# Patient Record
Sex: MALE | Race: WHITE | Employment: FULL TIME | URBAN - METROPOLITAN AREA
[De-identification: names, ages, dates, MRNs, and addresses within clinical notes are randomized per-mention and may not be internally consistent; named-entity substitution may affect disease eponyms.]

---

## 2019-06-11 ENCOUNTER — HOSPITAL ENCOUNTER (OUTPATIENT)
Dept: GENERAL RADIOLOGY | Age: 44
Discharge: HOME | End: 2019-06-11
Payer: COMMERCIAL

## 2019-06-11 DIAGNOSIS — S67.21XA CRUSH INJURY OF HAND, RIGHT, INITIAL ENCOUNTER: ICD-10-CM

## 2019-06-11 PROCEDURE — 73130 X-RAY EXAM OF HAND: CPT

## 2021-06-22 ENCOUNTER — OFFICE VISIT (OUTPATIENT)
Dept: SURGERY | Age: 46
End: 2021-06-22
Payer: COMMERCIAL

## 2021-06-22 VITALS
BODY MASS INDEX: 22.46 KG/M2 | HEART RATE: 80 BPM | SYSTOLIC BLOOD PRESSURE: 113 MMHG | WEIGHT: 175 LBS | HEIGHT: 74 IN | DIASTOLIC BLOOD PRESSURE: 72 MMHG

## 2021-06-22 DIAGNOSIS — K64.2 GRADE III HEMORRHOIDS: Primary | ICD-10-CM

## 2021-06-22 PROCEDURE — 99203 OFFICE O/P NEW LOW 30 MIN: CPT | Performed by: SURGERY

## 2021-06-22 RX ORDER — PRIMIDONE 50 MG/1
TABLET ORAL
COMMUNITY
Start: 2021-05-05

## 2021-06-22 NOTE — PATIENT INSTRUCTIONS
Hemorrhoids: Information and Care Instructions        Hemorrhoids are enlarged veins that develop in the anal canal. They can occur with constipation, diarrhea, straining and pushing during bowel movements, pregnancy, and smoking/coughing. The tissue can get irritated and inflamed, causing bleeding, itching, swelling, and pain. Hemorrhoids can be internal or external (or occasionally both). Sometimes internal hemorrhoids can prolapse, or come out of the anus, causing difficulty keeping clean, mucus drainage, bleeding, and discomfort. External hemorrhoids are more likely to clot and cause sudden severe pain on the outside of the anus. They can be uncomfortable at times, but hemorrhoids rarely are a life-threatening problem. However, not all bleeding and pain can be blamed on hemorrhoids until a thorough examination (and sometimes a colonoscopy) is performed. The initial treatment for both internal and external hemorrhoids is the same, as below:    STOOL REGIMEN for hemorrhoids:    Stools should be soft, formed, and easy to pass consistency, not diarrhea, without the need to strain. 1) Eat a healthy diet with lots of fruits and vegetables. Exercise and be active. 2) Use a fiber supplement twice daily (Metamucil, Benefiber, Konsyl, Citrucel, generic brand, etc) per package instructions. - Women should aim for 25 grams of fiber daily.    - Men should aim for 30-35 grams of fiber daily. 3) Drink 6-8 glasses of water per day. This will work with the fiber to regulate your stools. 4) MiraLax is safe to use every day, and can be used to help lubricate and soften stool. 5) Colace stool softeners can also be used as needed. Avoid Senna and sennakot laxative products, as they may damage the colon with long-term use. Warm water sitz baths (sit in a tub filled with 3-4 inches of warm water) can be done 1-2 times daily for 5 min to help with hygiene and relaxation.      DO NOT STRAIN ON THE

## 2021-06-22 NOTE — PROGRESS NOTES
deformity. Lips normal, normal dentition. Neck: No masses. Trachea midline. No crepitus. Thyroid not enlarged. Cardiovascular: Normal rate. No peripheral edema. Abdominal aorta normal size to palpation. Pulmonary/Chest: Effort normal. No respiratory distress. No wheezes. No use of accessory muscles. Musculoskeletal: Normal range of motion x all 4 extremities and head/neck, without deformity, pain, or crepitus, with normal strength and tone. Normal gait. Nails without clubbing or cyanosis. Neurological: Alert and oriented to person, place, and time. No gross deficits. Sensation intact. Skin: Skin is dry. No rashes noted. No pallor. No induration of nodules. Psychiatric: Normal mood and affect. Behavior normal. Oriented to person, place, and time. Judgment and insight reasonable. Abdominal/wound: soft, nontneder    During my initial anorectal exam I was unable to obtain clear visualization of the anal canal, so I determined an anoscopy would be required. I discussed with the patient and they agreed to proceed with anoscopy. ANOSCOPY:    Chaperone/MA present in room during entire exam. Patient was placed in left lateral down or knee-chest positioning depending on patient comfort. Exam table manipulated for proper visualization and lighting. Buttocks spread. Inspection reveals: no perianal skin lesions, excoriation. External hemorrhoids/tags: yes . Digital exam performed with lubricated index finger revealing: no masses, induration, or tenderness. No gross blood. Normal tone. Lubricated anoscope inserted gently into anus and withdrawn with light attachment for visualization of distal rectum and anal canal: Mucosa appeared normal, without masses or evidence of proctitis. Moderately inflamed hemorrhoidal tissue visible. Bleeding: no.    Anoscope removed without difficulty. Patient tolerated procedure well without complications. See below for further recommendations.     Labs reviewed: none  Radiology reviewed: none    Last colonoscopy: 1 yr ago - normal per his recollection      Assessment/Plan:     A/P:  New problem(s): Hemorrhoids: internal with bleeding, prolapse  Established problem(s): ext skin tag  Additional workup/treatment planned and options discussed:   1) Lifestyle and stool regimen: Fiber supplementation, sitz baths, improving dietary and lifestyle choices  2) Procedural: Rubber band ligation  3) Hemorrhoidectomy - if fails less invasive meaures  Risk of complications/morbidity: moderate    Patient has signs, symptoms, and exam consistent with moderately inflamed, prolapsing, bleeding hemorrhoids. We discussed the pathophysiology, etiology, natural history, workup, and treatment options, including procedural and surgical risks for hemorrhoids. We will start with conservative management, including OTC or prescription fiber supplementation, water, healthy fruits and vegetables, and medical management. We discussed avoiding straining, constipation, and diarrhea. Sitz baths discussed. If symptoms do not improve in a reasonable amount of time or patient is already having regular soft stool with conservative management, patient may require more invasive treatment options, such as in-office rubber band ligation. We discussed the rubber band ligation procedure, including prep, expectations, potential complications, postoperative discomfort. We discussed that this would be without sedation or anesthesia, and would likely result in temporary mild pressure discomfort. We discussed functional expectations, and risks of recurrence of hemorrhoids in the future. We discussed potential need for multiple banding sessions. Hemorrhoidectomy was discussed as an option, but more likely to be employed if the patient fails conservative and procedural methods as mentioned above.   Discussed that hemorrhoidectomy can be more definitive, but has significant postoperative pain component, which makes it much less desirable as an initial treatment option. At this point he is already trialed and failed medical management, and has also tried rubber band ligation x3. We discussed hemorrhoidectomy surgery, but I would like to try rubber banding at least 1 more time before planning for surgery. He thought this was reasonable. I provided written information in the After Visit Summary AVS Regarding: Hemorrhoids, rubber band ligation    DISPOSITION:  F/u for symptom reassessment, possible rubber band ligation    My findings will be relayed to consulting practitioner or PCP via Epic    Total encounter time:  30 min, including any number of the following: review of labs, imaging, provider notes, outside hospital records; performing examination/evaluation; counseling patient and family; ordering medications/tests; placing referrals and communication with referring physicians; coordination of care, and documentation in the EHR. Note completed using dictation software, please excuse any errors.     Electronically signed by Lele Cuello MD on 6/22/2021 at 11:03 AM

## 2021-07-13 ENCOUNTER — OFFICE VISIT (OUTPATIENT)
Dept: SURGERY | Age: 46
End: 2021-07-13
Payer: COMMERCIAL

## 2021-07-13 VITALS
TEMPERATURE: 97.4 F | DIASTOLIC BLOOD PRESSURE: 85 MMHG | BODY MASS INDEX: 22.84 KG/M2 | WEIGHT: 178 LBS | SYSTOLIC BLOOD PRESSURE: 129 MMHG | HEART RATE: 88 BPM | HEIGHT: 74 IN | OXYGEN SATURATION: 97 %

## 2021-07-13 DIAGNOSIS — K64.2 GRADE III HEMORRHOIDS: Primary | ICD-10-CM

## 2021-07-13 PROCEDURE — 46221 LIGATION OF HEMORRHOID(S): CPT | Performed by: SURGERY

## 2021-07-13 PROCEDURE — 99999 PR OFFICE/OUTPT VISIT,PROCEDURE ONLY: CPT | Performed by: SURGERY

## 2021-07-13 NOTE — PATIENT INSTRUCTIONS
DISCHARGE INSTRUCTIONS:     Rubber Band Ligation for Hemorrhoids: Before, During, and After Your Procedure    What is rubber band ligation? Rubber band ligation treats hemorrhoids. Hemorrhoids are swollen veins in the rectal area that can commonly cause bleeding, excess tissue (prolapse), discomfort, and difficulty keeping clean. This treatment is only for internal hemorrhoids. To do the procedure, your doctor puts a special viewing tool into your anus. This tool is called an anoscope. The doctor then uses a suction tool to grab the hemorrhoid and put a rubber band around it. The band stops the blood flow. This causes the hemorrhoids to shrink and fall off in 2 to 10 days, and purposeful scarring of the tissue back into the rectum. This procedure is done in the office. Typically one hemorrhoid is treated at a time during your first visit. More can be treated if a repeat procedure is required. The procedure takes about 10-15 minutes. You can go home when it's done. Some people are able to return to regular activities right away. It's very important not to strain when you have a bowel movement before and after your procedure. Continue with your daily fiber supplement (metamucil, benefiber, konsyl, generic brand), healthy fruits and vegetables, plenty of water (4-6 glasses per day), and MiraLax as needed. Stools should be soft and easy to pass, but not diarrhea. Preparing for the procedure  · Understand exactly what procedure is planned, along with the risks, benefits, and other options. · If you take blood thinners, such as warfarin (Coumadin), clopidogrel (Plavix), or aspirin, be sure to talk to your doctor. He or she will tell you if you should stop taking these medicines before your procedure. Make sure that you understand exactly what your doctor wants you to do. · Please perform a fleets enema 1-2 hours before your appointment. This will insure the rectum is cleaned out.  This can be provided from our office for free or at most drug stores over-the-counter. At the doctor's office  · Bring a picture ID, insurance information, and any required copay. Please arrive 10 minutes early. · The procedure is performed without sedation, as most patients have only minor discomfort, if any. · Some patients may experience lightheadedness right after the procedure and need to sit down for 5-10 minutes before leaving the office  · Some patients will feel more comfortable having someone else drive them to the appointment, though this is not required    After the procedure:  · There are no specific wound care instructions other than keeping stools soft as mentioned above  · Warm water soaks (5-10 minutes) can be helpful for any discomfort  · You may feel a \"fullness\" in your rectum and the urge to defecate during the first 24 hours. This is normal.  · You should expect the hemorrhoid tissue to fall off and pass within 2-10 days. This may be accompanied by passing tissue or a small amount of blood. This is normal.  · You will have an office appointment scheduled in 3-4 weeks to assess the need for additional rubber banding or other treatments. Risks and potential complications  · Potential need for additional rubber banding in office (common)  · Potential need for future hemorrhoid surgery (uncommon)  · Clotting and pain from external hemorrhoids (uncommon)  · Rectal bleeding requiring surgery (uncommon)  · Difficulty with urinating (uncommon)  · Damage to sphincter muscle resulting in changes in your ability to control stool or gas (rare)  · Infections requiring emergency surgery and colostomy (very rare)    When should you call the office? · You have any questions or concerns. · You don't understand how to prepare for your procedure.   · You experience sharp or severe pain that doesn't subside within 1-2 hours  · You have excessive bleeding, fever, chills, or inability to urinate  · You need to reschedule or have changed your mind about having the procedure. · Dr Macie Eller office phone # is [phone]  · If you are unable to reach the office (outside of normal business hours) and you have any concerns, go to your nearest emergency room.

## 2021-07-13 NOTE — PROGRESS NOTES
INTERNAL HEMORRHOID RUBBER BAND LIGATION PROCEDURE NOTE:    Patient presented with symptomatic internal hemorrhoids unresponsive to conservative management. See previous office notes for details of previous history and treatments. Risks of rubber band ligation explained to patient, including but not limited to: immediate and delayed bleeding, pain, infection, external hemorrhoids thrombosis, pelvic sepsis, urinary retention, sphincter dysfunction, need for additional procedures, and recurrence. Patient was previously provided with information in office AVS (after visit summary) and given opportunity to ask any questions and bring up any concerns. Chaperone/MA present in room during entire procedure. Patient was placed in either lateral or knee-chest positioning depending on patient preference. Exam table manipulated for proper visualization and lighting. Buttocks spread. Digital exam and anoscopy performed confirming internal hemorrhoids. Suction rubber band ligator used to place band in 3 positions, 1 cm proximal to the dentate line, at the apex of the internal hemorrhoid. Anoscope removed. Patient tolerated the procedure well without complication. EBL minimal. Post procedure expectations and instructions explained to patient. Written instructions provided as well. Disposition: Follow up in 4 weeks for symptom reassessment.     Electronically signed by Beatriz Abraham MD on 7/13/2021 at 2:54 PM

## 2021-08-18 ENCOUNTER — OFFICE VISIT (OUTPATIENT)
Dept: SURGERY | Age: 46
End: 2021-08-18
Payer: COMMERCIAL

## 2021-08-18 VITALS
WEIGHT: 178 LBS | BODY MASS INDEX: 22.84 KG/M2 | TEMPERATURE: 98.2 F | HEART RATE: 85 BPM | SYSTOLIC BLOOD PRESSURE: 135 MMHG | DIASTOLIC BLOOD PRESSURE: 77 MMHG | OXYGEN SATURATION: 98 % | HEIGHT: 74 IN

## 2021-08-18 DIAGNOSIS — K64.2 GRADE III HEMORRHOIDS: Primary | ICD-10-CM

## 2021-08-18 PROCEDURE — 99212 OFFICE O/P EST SF 10 MIN: CPT | Performed by: SURGERY

## 2021-08-18 NOTE — PROGRESS NOTES
[address] [address] [address] [facility]  Dept: [phone]  Dept Fax: [phone]  Loc: [phone]    Visit Date: 8/18/2021    Lashae Torrez is a 50 y.o. male who presents today for: Follow-up (RBL )      HPI:       Lashae Torrez is a 50 y.o. male who is here for follow-up after an office rubber band ligation about 1 month ago. Tells me that he is doing much better. No more bleeding. Much less discomfort and prolapse implants. Overall doing well and does not wish to have additional banding done today. No past medical history on file. No past surgical history on file. Current Outpatient Medications:     primidone (MYSOLINE) 50 MG tablet, TAKE 1 TABLET BY MOUTH EVERY 12 HOURS, Disp: , Rfl:   No Known Allergies  No past surgical history on file. No family history on file. Social History:   Social History     Tobacco Use    Smoking status: Never Smoker    Smokeless tobacco: Never Used   Substance Use Topics    Alcohol use: No      Tobacco cessation counseling provided as appropriate. REVIEW OF SYSTEMS:    Pertinent positives and negatives are mentioned in the HPI. Otherwise, all other systems were reviewed and negative. Objective:     Physical Exam   /77   Pulse 85   Temp 98.2 °F (36.8 °C) (Oral)   Ht 6' 2\" (1.88 m)   Wt 178 lb (80.7 kg)   SpO2 98%   BMI 22.85 kg/m²   Constitutional: Appears well-developed and well-nourished. Grooming appropriate. No gross deformities. Body mass index is 22.85 kg/m². Eyes: No scleral icterus. Conjunctiva/lids normal. Vision intact grossly. Pupils equal/symmetric, reactive bilaterally. ENT: External ears/nose without defect, scars, or masses. Hearing grossly intact. No facial deformity. Lips normal, normal dentition. Neck: No masses. Trachea midline. No crepitus. Thyroid not enlarged. Cardiovascular: Normal rate. No peripheral edema.  Abdominal aorta normal size to palpation. Pulmonary/Chest: Effort normal. No respiratory distress. No wheezes. No use of accessory muscles. Musculoskeletal: Normal range of motion of head/neck, without deformity, pain, or crepitus, with normal strength and tone. Normal gait. Nails without clubbing or cyanosis. Neurological: Alert and oriented to person, place, and time. No gross deficits. Sensation intact. Skin: Skin is dry. No rashes noted. No pallor. No induration of nodules. Psychiatric: Normal mood and affect. Behavior normal. Oriented to person, place, and time. Judgment and insight reasonable. Labs reviewed: None     Imaging reviewed: None    Assessment/Plan:       A/P:  Established problem(s): Internal hemorrhoids, resolving  Additional workup/treatment planned: Continue with expectant/conservative management  Risk of complications/morbidity: Low    Overall doing well after rubber band ligation of internal hemorrhoids. No more bleeding symptoms. Does not wish to have additional banding, which is perfectly reasonable. He can follow-up with me as needed. He is seeing Dr. Joel Weaver for his routine colonoscopies. DISPOSITION:  F/u PRN    My findings will be relayed to consulting practitioner or PCP via Epic note    Total encounter time:  10 min, including any number of the following: review of labs, imaging, provider notes, outside hospital records; performing examination/evaluation; counseling patient and family; ordering medications/tests; placing referrals and communication with referring physicians; coordination of care, and documentation in the EHR. Note completed using dictation software, please excuse any errors.     Electronically signed by Mayra Riggins MD on 8/18/2021 at 3:33 PM

## 2021-08-24 ENCOUNTER — OFFICE VISIT (OUTPATIENT)
Dept: SURGERY | Age: 46
End: 2021-08-24
Payer: COMMERCIAL

## 2021-08-24 VITALS
OXYGEN SATURATION: 96 % | HEART RATE: 86 BPM | HEIGHT: 74 IN | SYSTOLIC BLOOD PRESSURE: 113 MMHG | WEIGHT: 177.2 LBS | DIASTOLIC BLOOD PRESSURE: 73 MMHG | TEMPERATURE: 97.4 F | BODY MASS INDEX: 22.74 KG/M2 | RESPIRATION RATE: 14 BRPM

## 2021-08-24 DIAGNOSIS — K64.2 GRADE III HEMORRHOIDS: Primary | ICD-10-CM

## 2021-08-24 PROCEDURE — 46221 LIGATION OF HEMORRHOID(S): CPT | Performed by: SURGERY

## 2021-08-24 NOTE — PROGRESS NOTES
INTERNAL HEMORRHOID RUBBER BAND LIGATION PROCEDURE NOTE:    Patient presented with symptomatic internal hemorrhoids unresponsive to conservative management. See previous office notes for details of previous history and treatments. Risks of rubber band ligation explained to patient, including but not limited to: immediate and delayed bleeding, pain, infection, external hemorrhoids thrombosis, pelvic sepsis, urinary retention, sphincter dysfunction, need for additional procedures, and recurrence. Patient was previously provided with information in office AVS (after visit summary) and given opportunity to ask any questions and bring up any concerns. Chaperone/MA present in room during entire procedure. Patient was placed in either lateral or knee-chest positioning depending on patient preference. Exam table manipulated for proper visualization and lighting. Buttocks spread. Digital exam and anoscopy performed confirming internal hemorrhoids. Suction rubber band ligator used to place band in 3 positions, 1 cm proximal to the dentate line, at the apex of the internal hemorrhoid. Anoscope removed. Patient tolerated the procedure well without complication. EBL minimal. Post procedure expectations and instructions explained to patient. Written instructions provided as well. Disposition: Follow up in 4 weeks for symptom reassessment.     Electronically signed by Gerhardt Cords, MD on 8/24/2021 at 2:15 PM

## 2021-08-24 NOTE — PATIENT INSTRUCTIONS
DISCHARGE INSTRUCTIONS:     Rubber Band Ligation for Hemorrhoids: Before, During, and After Your Procedure    What is rubber band ligation? Rubber band ligation treats hemorrhoids. Hemorrhoids are swollen veins in the rectal area that can commonly cause bleeding, excess tissue (prolapse), discomfort, and difficulty keeping clean. This treatment is only for internal hemorrhoids. To do the procedure, your doctor puts a special viewing tool into your anus. This tool is called an anoscope. The doctor then uses a suction tool to grab the hemorrhoid and put a rubber band around it. The band stops the blood flow. This causes the hemorrhoids to shrink and fall off in 2 to 10 days, and purposeful scarring of the tissue back into the rectum. This procedure is done in the office. Typically one hemorrhoid is treated at a time during your first visit. More can be treated if a repeat procedure is required. The procedure takes about 10-15 minutes. You can go home when it's done. Some people are able to return to regular activities right away. It's very important not to strain when you have a bowel movement before and after your procedure. Continue with your daily fiber supplement (metamucil, benefiber, konsyl, generic brand), healthy fruits and vegetables, plenty of water (4-6 glasses per day), and MiraLax as needed. Stools should be soft and easy to pass, but not diarrhea. Preparing for the procedure  · Understand exactly what procedure is planned, along with the risks, benefits, and other options. · If you take blood thinners, such as warfarin (Coumadin), clopidogrel (Plavix), or aspirin, be sure to talk to your doctor. He or she will tell you if you should stop taking these medicines before your procedure. Make sure that you understand exactly what your doctor wants you to do. · Please perform a fleets enema 1-2 hours before your appointment. This will insure the rectum is cleaned out.  This can be provided from our office for free or at most drug stores over-the-counter. At the doctor's office  · Bring a picture ID, insurance information, and any required copay. Please arrive 10 minutes early. · The procedure is performed without sedation, as most patients have only minor discomfort, if any. · Some patients may experience lightheadedness right after the procedure and need to sit down for 5-10 minutes before leaving the office  · Some patients will feel more comfortable having someone else drive them to the appointment, though this is not required    After the procedure:  · There are no specific wound care instructions other than keeping stools soft as mentioned above  · Warm water soaks (5-10 minutes) can be helpful for any discomfort  · You may feel a \"fullness\" in your rectum and the urge to defecate during the first 24 hours. This is normal.  · You should expect the hemorrhoid tissue to fall off and pass within 2-10 days. This may be accompanied by passing tissue or a small amount of blood. This is normal.  · You will have an office appointment scheduled in 3-4 weeks to assess the need for additional rubber banding or other treatments. Risks and potential complications  · Potential need for additional rubber banding in office (common)  · Potential need for future hemorrhoid surgery (uncommon)  · Clotting and pain from external hemorrhoids (uncommon)  · Rectal bleeding requiring surgery (uncommon)  · Difficulty with urinating (uncommon)  · Damage to sphincter muscle resulting in changes in your ability to control stool or gas (rare)  · Infections requiring emergency surgery and colostomy (very rare)    When should you call the office? · You have any questions or concerns. · You don't understand how to prepare for your procedure.   · You experience sharp or severe pain that doesn't subside within 1-2 hours  · You have excessive bleeding, fever, chills, or inability to urinate  · You need to reschedule or have changed your mind about having the procedure. · Dr Aj Mancuso office phone # is [phone]  · If you are unable to reach the office (outside of normal business hours) and you have any concerns, go to your nearest emergency room.

## 2021-10-06 ENCOUNTER — OFFICE VISIT (OUTPATIENT)
Dept: SURGERY | Age: 46
End: 2021-10-06
Payer: COMMERCIAL

## 2021-10-06 VITALS
BODY MASS INDEX: 23.36 KG/M2 | SYSTOLIC BLOOD PRESSURE: 131 MMHG | HEART RATE: 75 BPM | DIASTOLIC BLOOD PRESSURE: 78 MMHG | WEIGHT: 182 LBS | HEIGHT: 74 IN | OXYGEN SATURATION: 96 % | TEMPERATURE: 98.1 F

## 2021-10-06 DIAGNOSIS — K64.2 GRADE III HEMORRHOIDS: Primary | ICD-10-CM

## 2021-10-06 PROCEDURE — 46221 LIGATION OF HEMORRHOID(S): CPT | Performed by: SURGERY

## 2021-10-06 NOTE — PATIENT INSTRUCTIONS
DISCHARGE INSTRUCTIONS:     Rubber Band Ligation for Hemorrhoids: Before, During, and After Your Procedure    What is rubber band ligation? Rubber band ligation treats hemorrhoids. Hemorrhoids are swollen veins in the rectal area that can commonly cause bleeding, excess tissue (prolapse), discomfort, and difficulty keeping clean. This treatment is only for internal hemorrhoids. To do the procedure, your doctor puts a special viewing tool into your anus. This tool is called an anoscope. The doctor then uses a suction tool to grab the hemorrhoid and put a rubber band around it. The band stops the blood flow. This causes the hemorrhoids to shrink and fall off in 2 to 10 days, and purposeful scarring of the tissue back into the rectum. This procedure is done in the office. Typically one hemorrhoid is treated at a time during your first visit. More can be treated if a repeat procedure is required. The procedure takes about 10-15 minutes. You can go home when it's done. Some people are able to return to regular activities right away. It's very important not to strain when you have a bowel movement before and after your procedure. Continue with your daily fiber supplement (metamucil, benefiber, konsyl, generic brand), healthy fruits and vegetables, plenty of water (4-6 glasses per day), and MiraLax as needed. Stools should be soft and easy to pass, but not diarrhea. Preparing for the procedure  · Understand exactly what procedure is planned, along with the risks, benefits, and other options. · If you take blood thinners, such as warfarin (Coumadin), clopidogrel (Plavix), or aspirin, be sure to talk to your doctor. He or she will tell you if you should stop taking these medicines before your procedure. Make sure that you understand exactly what your doctor wants you to do. · Please perform a fleets enema 1-2 hours before your appointment. This will insure the rectum is cleaned out.  This can be provided from our office for free or at most drug stores over-the-counter. At the doctor's office  · Bring a picture ID, insurance information, and any required copay. Please arrive 10 minutes early. · The procedure is performed without sedation, as most patients have only minor discomfort, if any. · Some patients may experience lightheadedness right after the procedure and need to sit down for 5-10 minutes before leaving the office  · Some patients will feel more comfortable having someone else drive them to the appointment, though this is not required    After the procedure:  · There are no specific wound care instructions other than keeping stools soft as mentioned above  · Warm water soaks (5-10 minutes) can be helpful for any discomfort  · You may feel a \"fullness\" in your rectum and the urge to defecate during the first 24 hours. This is normal.  · You should expect the hemorrhoid tissue to fall off and pass within 2-10 days. This may be accompanied by passing tissue or a small amount of blood. This is normal.  · You will have an office appointment scheduled in 3-4 weeks to assess the need for additional rubber banding or other treatments. Risks and potential complications  · Potential need for additional rubber banding in office (common)  · Potential need for future hemorrhoid surgery (uncommon)  · Clotting and pain from external hemorrhoids (uncommon)  · Rectal bleeding requiring surgery (uncommon)  · Difficulty with urinating (uncommon)  · Damage to sphincter muscle resulting in changes in your ability to control stool or gas (rare)  · Infections requiring emergency surgery and colostomy (very rare)    When should you call the office? · You have any questions or concerns. · You don't understand how to prepare for your procedure.   · You experience sharp or severe pain that doesn't subside within 1-2 hours  · You have excessive bleeding, fever, chills, or inability to urinate  · You need to reschedule or

## 2021-10-06 NOTE — PROGRESS NOTES
INTERNAL HEMORRHOID RUBBER BAND LIGATION PROCEDURE NOTE:    Patient presented with symptomatic internal hemorrhoids unresponsive to conservative management. See previous office notes for details of previous history and treatments. Risks of rubber band ligation explained to patient, including but not limited to: immediate and delayed bleeding, pain, infection, external hemorrhoids thrombosis, pelvic sepsis, urinary retention, sphincter dysfunction, need for additional procedures, and recurrence. Patient was previously provided with information in office AVS (after visit summary) and given opportunity to ask any questions and bring up any concerns. Chaperone/MA present in room during entire procedure. Patient was placed in either lateral or knee-chest positioning depending on patient preference. Exam table manipulated for proper visualization and lighting. Buttocks spread. Digital exam and anoscopy performed confirming internal hemorrhoids. Suction rubber band ligator used to place band in 3 positions, 1 cm proximal to the dentate line, at the apex of the internal hemorrhoid. Anoscope removed. Patient tolerated the procedure well without complication. EBL minimal. Post procedure expectations and instructions explained to patient. Written instructions provided as well. Disposition: Follow up in 4 weeks for symptom reassessment.     Electronically signed by Lianna Malik MD on 10/6/2021 at 4:10 PM